# Patient Record
Sex: FEMALE | Race: WHITE | NOT HISPANIC OR LATINO | Employment: PART TIME | URBAN - METROPOLITAN AREA
[De-identification: names, ages, dates, MRNs, and addresses within clinical notes are randomized per-mention and may not be internally consistent; named-entity substitution may affect disease eponyms.]

---

## 2019-09-07 ENCOUNTER — OFFICE VISIT (OUTPATIENT)
Dept: URGENT CARE | Facility: CLINIC | Age: 39
End: 2019-09-07
Payer: COMMERCIAL

## 2019-09-07 VITALS
BODY MASS INDEX: 26.81 KG/M2 | HEART RATE: 64 BPM | HEIGHT: 69 IN | OXYGEN SATURATION: 100 % | DIASTOLIC BLOOD PRESSURE: 81 MMHG | SYSTOLIC BLOOD PRESSURE: 142 MMHG | WEIGHT: 181 LBS | TEMPERATURE: 97.1 F | RESPIRATION RATE: 16 BRPM

## 2019-09-07 DIAGNOSIS — H65.03 BILATERAL ACUTE SEROUS OTITIS MEDIA, RECURRENCE NOT SPECIFIED: Primary | ICD-10-CM

## 2019-09-07 PROCEDURE — G0381 LEV 2 HOSP TYPE B ED VISIT: HCPCS | Performed by: EMERGENCY MEDICINE

## 2019-09-07 RX ORDER — MULTIVITAMIN
1 TABLET ORAL DAILY
COMMUNITY

## 2019-09-07 RX ORDER — FEXOFENADINE HCL 180 MG/1
180 TABLET ORAL DAILY
COMMUNITY

## 2019-09-07 RX ORDER — PREDNISONE 10 MG/1
TABLET ORAL
Qty: 27 TABLET | Refills: 0 | Status: SHIPPED | OUTPATIENT
Start: 2019-09-07

## 2019-09-07 NOTE — PROGRESS NOTES
[facility]        NAME: Luiza De Jesus is a 42 y o  female  : 1980    MRN: [id number]  DATE: 2019  TIME: 2:10 PM    Assessment and Plan   Bilateral acute serous otitis media, recurrence not specified [H65 03]  1  Bilateral acute serous otitis media, recurrence not specified  predniSONE 10 mg tablet         Patient Instructions     Patient Instructions     Serous Otitis Media   WHAT YOU NEED TO KNOW:   Serous otitis media is fluid trapped behind your tympanic membrane (eardrum), without an ear infection  Your eardrum is in your middle ear  Serous otitis media is also called otitis media with effusion  You may have fluid in your ear for months, but it usually goes away on its own  The fluid may be in one or both ears  The fluid may cause muffled sounds, and you may feel like your ears are full  Serous otitis media may be caused by an upper respiratory infection or allergies  It is most common in the fall and early spring  DISCHARGE INSTRUCTIONS:   Return to the emergency department if:   · You have a fever  · You have a sudden loss of hearing in your affected ear  · You develop a severe headache and stiff neck  · You have a seizure  Contact your healthcare provider if:   · You have fluid draining from your ear  · You have new symptoms  · You have questions or concerns about your condition or care  Follow up with your healthcare provider as directed: Your ears will need to be checked regularly  You may need to see a specialist  Write down your questions so you remember to ask them during your visits  [address]  Information is for End User's use only and may not be sold, redistributed or otherwise used for commercial purposes  All illustrations and images included in CareNotes® are the copyrighted property of [employer]  or Harry Kerr  The above information is an  only   It is not intended as medical advice for individual conditions or treatments  Talk to your doctor, nurse or pharmacist before following any medical regimen to see if it is safe and effective for you  Follow up with PCP in 3-5 days  Proceed to  ER if symptoms worsen  Chief Complaint     Chief Complaint   Patient presents with    Earache     c/o pain both ears approx 1 week, states feels her lymph nodes are swollen and has intermittant nausea         History of Present Illness       Patient complains of bilateral ear pain for the past 1 week  Review of Systems   Review of Systems   Constitutional: Negative for chills and fever  HENT: Positive for ear pain  Negative for ear discharge, rhinorrhea, sinus pressure, sore throat, tinnitus, trouble swallowing and voice change  Respiratory: Negative for cough, chest tightness, shortness of breath and wheezing  Neurological: Negative for dizziness and light-headedness  Current Medications       Current Outpatient Medications:     fexofenadine (ALLEGRA) 180 MG tablet, Take 180 mg by mouth daily, Disp: , Rfl:     Lifitegrast ([facility]), Apply to eye, Disp: , Rfl:     Multiple Vitamin (MULTIVITAMIN) tablet, Take 1 tablet by mouth daily, Disp: , Rfl:     Probiotic Product (PROBIOTIC PO), Take by mouth, Disp: , Rfl:     predniSONE 10 mg tablet, Take once daily all days pills on this schedule 6- 6- 5- 4- 3- 2- 1, Disp: 27 tablet, Rfl: 0    Current Allergies     Allergies as of 09/07/2019    (No Known Allergies)            The following portions of the patient's history were reviewed and updated as appropriate: allergies, current medications, past family history, past medical history, past social history, past surgical history and problem list      Past Medical History:   Diagnosis Date    Allergic     Dry eye        History reviewed  No pertinent surgical history  History reviewed  No pertinent family history  Medications have been verified          Objective   /81 Pulse 64   Temp (!) 97 1 °F (36 2 °C) (Tympanic)   Resp 16   Ht 5' 9" (1 753 m)   Wt 82 1 kg (181 lb)   SpO2 100%   BMI 26 73 kg/m²        Physical Exam     Physical Exam   Constitutional: She is oriented to person, place, and time  She appears well-developed and well-nourished  No distress  HENT:   Head: Normocephalic and atraumatic  Right Ear: Ear canal normal  No drainage  A middle ear effusion is present  Left Ear: Ear canal normal  No drainage  A middle ear effusion is present  Nose: Mucosal edema present  Mouth/Throat: Mucous membranes are normal  No oropharyngeal exudate, posterior oropharyngeal erythema or tonsillar abscesses  Clear effusion behind TMs, no erythema of TMs  Neck: Neck supple  Lymphadenopathy:     She has no cervical adenopathy  Neurological: She is alert and oriented to person, place, and time  Skin: Skin is warm and dry  Psychiatric: She has a normal mood and affect  Her behavior is normal  Judgment and thought content normal    Nursing note and vitals reviewed

## 2019-09-07 NOTE — PATIENT INSTRUCTIONS
Serous Otitis Media   WHAT YOU NEED TO KNOW:   Serous otitis media is fluid trapped behind your tympanic membrane (eardrum), without an ear infection  Your eardrum is in your middle ear  Serous otitis media is also called otitis media with effusion  You may have fluid in your ear for months, but it usually goes away on its own  The fluid may be in one or both ears  The fluid may cause muffled sounds, and you may feel like your ears are full  Serous otitis media may be caused by an upper respiratory infection or allergies  It is most common in the fall and early spring  DISCHARGE INSTRUCTIONS:   Return to the emergency department if:   · You have a fever  · You have a sudden loss of hearing in your affected ear  · You develop a severe headache and stiff neck  · You have a seizure  Contact your healthcare provider if:   · You have fluid draining from your ear  · You have new symptoms  · You have questions or concerns about your condition or care  Follow up with your healthcare provider as directed: Your ears will need to be checked regularly  You may need to see a specialist  Write down your questions so you remember to ask them during your visits  © 2017 [facility] Information is for End User's use only and may not be sold, redistributed or otherwise used for commercial purposes  All illustrations and images included in CareNotes® are the copyrighted property of [employer]  or Harry Kerr  The above information is an  only  It is not intended as medical advice for individual conditions or treatments  Talk to your doctor, nurse or pharmacist before following any medical regimen to see if it is safe and effective for you